# Patient Record
Sex: FEMALE | Race: WHITE | Employment: STUDENT | URBAN - METROPOLITAN AREA
[De-identification: names, ages, dates, MRNs, and addresses within clinical notes are randomized per-mention and may not be internally consistent; named-entity substitution may affect disease eponyms.]

---

## 2022-12-23 ENCOUNTER — HOSPITAL ENCOUNTER (OUTPATIENT)
Age: 27
Discharge: HOME | End: 2022-12-23
Payer: COMMERCIAL

## 2022-12-23 VITALS
HEART RATE: 67 BPM | OXYGEN SATURATION: 100 % | TEMPERATURE: 98 F | SYSTOLIC BLOOD PRESSURE: 127 MMHG | DIASTOLIC BLOOD PRESSURE: 60 MMHG | RESPIRATION RATE: 20 BRPM

## 2022-12-23 DIAGNOSIS — L25.2 CONTACT DERMATITIS DUE TO DYE, UNSPECIFIED CONTACT DERMATITIS TYPE: Primary | ICD-10-CM

## 2022-12-23 RX ORDER — ESCITALOPRAM OXALATE 10 MG/1
15 TABLET ORAL DAILY
COMMUNITY
Start: 2022-12-01

## 2022-12-23 RX ORDER — PREDNISONE 20 MG/1
TABLET ORAL
Qty: 15 TABLET | Refills: 0 | Status: SHIPPED | OUTPATIENT
Start: 2022-12-23 | End: 2022-12-31

## 2022-12-23 RX ORDER — HYDROXYZINE HYDROCHLORIDE 25 MG/1
TABLET, FILM COATED ORAL EVERY 6 HOURS PRN
Qty: 20 TABLET | Refills: 0 | Status: SHIPPED | OUTPATIENT
Start: 2022-12-23 | End: 2023-01-22

## 2022-12-23 NOTE — ED INITIAL ASSESSMENT (HPI)
Patient is here with an allergic reaction to hair dye from last week. She states she was on prednisone but is now done with it but the redness and swelling is coming back.

## 2022-12-23 NOTE — DISCHARGE INSTRUCTIONS
Take the Atarax as needed for itching as directed. You may use a long-acting antihistamine such as Zyrtec or Allegra as well. Take the steroid daily as directed. Take it at the same time each day. Follow-up with your primary care provider, dermatologist and/or allergist were the ones provided for reevaluation in the next 1 week. Seek care in the emergency room for any swelling inside the mouth or to the tongue, difficulty breathing, wheezing, vomiting or fever.